# Patient Record
Sex: MALE | Race: WHITE | Employment: PART TIME | URBAN - METROPOLITAN AREA
[De-identification: names, ages, dates, MRNs, and addresses within clinical notes are randomized per-mention and may not be internally consistent; named-entity substitution may affect disease eponyms.]

---

## 2017-03-07 ENCOUNTER — TELEPHONE (OUTPATIENT)
Dept: INTERNAL MEDICINE CLINIC | Age: 42
End: 2017-03-07

## 2017-03-07 NOTE — TELEPHONE ENCOUNTER
Pt called has a new patient apt. for 3 today. States unable to make it because he is a  and been in court all day with a bad case. This is his second time rescheduling. He did not reschedule because he needs to go to bed before having to work. States he will call back to reschedule at a later date.